# Patient Record
Sex: MALE | Race: WHITE | NOT HISPANIC OR LATINO | Employment: OTHER | URBAN - METROPOLITAN AREA
[De-identification: names, ages, dates, MRNs, and addresses within clinical notes are randomized per-mention and may not be internally consistent; named-entity substitution may affect disease eponyms.]

---

## 2018-03-06 PROBLEM — D64.9 SYMPTOMATIC ANEMIA: Status: ACTIVE | Noted: 2018-03-06

## 2018-09-12 PROBLEM — D64.9 ANEMIA: Status: ACTIVE | Noted: 2018-09-12

## 2018-09-13 PROBLEM — F31.9 BIPOLAR DISORDER: Status: ACTIVE | Noted: 2018-09-13

## 2018-09-13 PROBLEM — D50.9 IRON DEFICIENCY ANEMIA: Status: ACTIVE | Noted: 2018-09-13

## 2018-09-13 PROBLEM — E78.5 DYSLIPIDEMIA: Status: ACTIVE | Noted: 2018-09-13

## 2019-10-26 PROBLEM — A41.9 SEPSIS: Status: ACTIVE | Noted: 2019-10-26

## 2019-10-27 PROBLEM — N39.0 ACUTE UTI: Status: ACTIVE | Noted: 2019-10-27

## 2019-10-27 PROBLEM — J96.01 ACUTE HYPOXEMIC RESPIRATORY FAILURE: Status: ACTIVE | Noted: 2019-10-27

## 2019-10-27 PROBLEM — E66.9 OBESITY (BMI 30-39.9): Status: ACTIVE | Noted: 2019-10-27

## 2019-10-27 PROBLEM — R11.2 NAUSEA AND VOMITING: Status: ACTIVE | Noted: 2019-10-27

## 2019-10-28 PROBLEM — R63.8 INADEQUATE ORAL INTAKE: Status: ACTIVE | Noted: 2019-10-28

## 2019-10-28 PROBLEM — J69.0 ASPIRATION PNEUMONIA OF LEFT LOWER LOBE: Status: ACTIVE | Noted: 2019-10-28

## 2020-05-08 PROBLEM — U07.1 COVID-19 VIRUS DETECTED: Status: ACTIVE | Noted: 2020-05-08

## 2020-05-09 PROBLEM — J18.9 BILATERAL PNEUMONIA: Status: ACTIVE | Noted: 2020-05-09

## 2020-05-14 PROBLEM — R63.8 ALTERATION IN NUTRITION: Status: ACTIVE | Noted: 2020-05-14

## 2020-05-18 PROBLEM — R63.8 ALTERATION IN NUTRITION: Status: RESOLVED | Noted: 2020-05-14 | Resolved: 2020-05-18

## 2020-06-05 ENCOUNTER — OUTPATIENT CASE MANAGEMENT (OUTPATIENT)
Dept: ADMINISTRATIVE | Facility: OTHER | Age: 65
End: 2020-06-05

## 2020-06-05 NOTE — PROGRESS NOTES
06/05/20-Patient discharged on 05/22/20 to [facility] detention. OPCM Case Closure.

## 2021-07-07 PROBLEM — R41.82 ALTERED MENTAL STATUS: Status: ACTIVE | Noted: 2021-01-01

## 2021-07-08 PROBLEM — T68.XXXA HYPOTHERMIA: Status: ACTIVE | Noted: 2021-01-01

## 2021-07-12 PROBLEM — R13.19 DYSPHAGIA, NEUROLOGIC: Status: ACTIVE | Noted: 2021-01-01

## 2021-07-17 PROBLEM — N17.9 AKI (ACUTE KIDNEY INJURY): Status: ACTIVE | Noted: 2021-01-01

## 2021-07-17 PROBLEM — R65.21 SEPTIC SHOCK: Status: ACTIVE | Noted: 2019-10-26

## 2021-07-23 PROBLEM — B49 FUNGEMIA: Status: ACTIVE | Noted: 2021-01-01
